# Patient Record
Sex: FEMALE | Race: WHITE | HISPANIC OR LATINO
[De-identification: names, ages, dates, MRNs, and addresses within clinical notes are randomized per-mention and may not be internally consistent; named-entity substitution may affect disease eponyms.]

---

## 2017-07-27 PROBLEM — Z00.00 ENCOUNTER FOR PREVENTIVE HEALTH EXAMINATION: Status: ACTIVE | Noted: 2017-07-27

## 2017-08-09 ENCOUNTER — APPOINTMENT (OUTPATIENT)
Dept: CARDIOLOGY | Facility: CLINIC | Age: 47
End: 2017-08-09
Payer: COMMERCIAL

## 2017-08-09 PROCEDURE — 93000 ELECTROCARDIOGRAM COMPLETE: CPT

## 2017-08-09 PROCEDURE — 99214 OFFICE O/P EST MOD 30 MIN: CPT

## 2017-08-23 ENCOUNTER — APPOINTMENT (OUTPATIENT)
Dept: CARDIOLOGY | Facility: CLINIC | Age: 47
End: 2017-08-23

## 2017-11-25 ENCOUNTER — RX RENEWAL (OUTPATIENT)
Age: 47
End: 2017-11-25

## 2018-01-12 ENCOUNTER — RECORD ABSTRACTING (OUTPATIENT)
Age: 48
End: 2018-01-12

## 2018-01-15 ENCOUNTER — APPOINTMENT (OUTPATIENT)
Dept: CARDIOLOGY | Facility: CLINIC | Age: 48
End: 2018-01-15
Payer: COMMERCIAL

## 2018-01-15 VITALS
HEART RATE: 72 BPM | BODY MASS INDEX: 45.76 KG/M2 | DIASTOLIC BLOOD PRESSURE: 84 MMHG | SYSTOLIC BLOOD PRESSURE: 140 MMHG | WEIGHT: 227 LBS | HEIGHT: 59 IN

## 2018-01-15 DIAGNOSIS — Z78.9 OTHER SPECIFIED HEALTH STATUS: ICD-10-CM

## 2018-01-15 DIAGNOSIS — Z82.49 FAMILY HISTORY OF ISCHEMIC HEART DISEASE AND OTHER DISEASES OF THE CIRCULATORY SYSTEM: ICD-10-CM

## 2018-01-15 DIAGNOSIS — Z82.5 FAMILY HISTORY OF ASTHMA AND OTHER CHRONIC LOWER RESPIRATORY DISEASES: ICD-10-CM

## 2018-01-15 PROCEDURE — 99213 OFFICE O/P EST LOW 20 MIN: CPT

## 2018-01-18 ENCOUNTER — RX RENEWAL (OUTPATIENT)
Age: 48
End: 2018-01-18

## 2018-02-20 ENCOUNTER — APPOINTMENT (OUTPATIENT)
Dept: CARDIOLOGY | Facility: CLINIC | Age: 48
End: 2018-02-20

## 2018-02-23 ENCOUNTER — RX RENEWAL (OUTPATIENT)
Age: 48
End: 2018-02-23

## 2018-02-23 ENCOUNTER — APPOINTMENT (OUTPATIENT)
Dept: CARDIOLOGY | Facility: CLINIC | Age: 48
End: 2018-02-23
Payer: COMMERCIAL

## 2018-02-23 VITALS
BODY MASS INDEX: 46.16 KG/M2 | HEIGHT: 59 IN | HEART RATE: 58 BPM | SYSTOLIC BLOOD PRESSURE: 110 MMHG | DIASTOLIC BLOOD PRESSURE: 68 MMHG | WEIGHT: 229 LBS

## 2018-02-23 PROCEDURE — 99214 OFFICE O/P EST MOD 30 MIN: CPT

## 2018-02-23 RX ORDER — CHLORTHALIDONE 25 MG/1
25 TABLET ORAL DAILY
Qty: 90 | Refills: 1 | Status: DISCONTINUED | COMMUNITY
Start: 2018-01-18 | End: 2018-02-23

## 2018-03-12 ENCOUNTER — APPOINTMENT (OUTPATIENT)
Dept: CARDIOLOGY | Facility: CLINIC | Age: 48
End: 2018-03-12
Payer: COMMERCIAL

## 2018-03-12 VITALS
SYSTOLIC BLOOD PRESSURE: 130 MMHG | WEIGHT: 226 LBS | BODY MASS INDEX: 45.56 KG/M2 | DIASTOLIC BLOOD PRESSURE: 90 MMHG | HEART RATE: 64 BPM | HEIGHT: 59 IN

## 2018-03-12 PROCEDURE — 99213 OFFICE O/P EST LOW 20 MIN: CPT

## 2018-03-12 RX ORDER — ASPIRIN 325 MG/1
325 TABLET, COATED ORAL DAILY
Refills: 0 | Status: DISCONTINUED | COMMUNITY
End: 2018-03-12

## 2018-03-12 RX ORDER — POTASSIUM CHLORIDE 750 MG/1
10 TABLET, FILM COATED, EXTENDED RELEASE ORAL DAILY
Qty: 90 | Refills: 1 | Status: DISCONTINUED | COMMUNITY
Start: 2018-01-18 | End: 2018-03-12

## 2018-04-15 ENCOUNTER — RX RENEWAL (OUTPATIENT)
Age: 48
End: 2018-04-15

## 2018-06-10 ENCOUNTER — MEDICATION RENEWAL (OUTPATIENT)
Age: 48
End: 2018-06-10

## 2018-06-12 ENCOUNTER — MEDICATION RENEWAL (OUTPATIENT)
Age: 48
End: 2018-06-12

## 2018-11-26 ENCOUNTER — RX RENEWAL (OUTPATIENT)
Age: 48
End: 2018-11-26

## 2019-03-11 ENCOUNTER — APPOINTMENT (OUTPATIENT)
Dept: CARDIOLOGY | Facility: CLINIC | Age: 49
End: 2019-03-11

## 2019-06-20 ENCOUNTER — RX RENEWAL (OUTPATIENT)
Age: 49
End: 2019-06-20

## 2019-07-08 ENCOUNTER — TRANSCRIPTION ENCOUNTER (OUTPATIENT)
Age: 49
End: 2019-07-08

## 2019-09-03 ENCOUNTER — RX RENEWAL (OUTPATIENT)
Age: 49
End: 2019-09-03

## 2019-09-24 ENCOUNTER — RX CHANGE (OUTPATIENT)
Age: 49
End: 2019-09-24

## 2019-11-18 ENCOUNTER — MEDICATION RENEWAL (OUTPATIENT)
Age: 49
End: 2019-11-18

## 2020-05-04 RX ORDER — CHLORTHALIDONE 25 MG/1
25 TABLET ORAL DAILY
Qty: 90 | Refills: 0 | Status: DISCONTINUED | COMMUNITY
Start: 2018-04-26 | End: 2020-05-04

## 2020-05-04 RX ORDER — POTASSIUM CHLORIDE 750 MG/1
10 CAPSULE, EXTENDED RELEASE ORAL
Qty: 90 | Refills: 2 | Status: DISCONTINUED | COMMUNITY
Start: 2018-04-26 | End: 2020-05-04

## 2020-05-08 ENCOUNTER — APPOINTMENT (OUTPATIENT)
Dept: CARDIOLOGY | Facility: CLINIC | Age: 50
End: 2020-05-08
Payer: COMMERCIAL

## 2020-05-08 VITALS
SYSTOLIC BLOOD PRESSURE: 140 MMHG | HEIGHT: 59 IN | TEMPERATURE: 98 F | OXYGEN SATURATION: 96 % | WEIGHT: 246 LBS | RESPIRATION RATE: 16 BRPM | HEART RATE: 63 BPM | DIASTOLIC BLOOD PRESSURE: 92 MMHG | BODY MASS INDEX: 49.59 KG/M2

## 2020-05-08 PROCEDURE — 99213 OFFICE O/P EST LOW 20 MIN: CPT

## 2020-05-08 NOTE — PHYSICAL EXAM
[Well Groomed] : well groomed [Normal Appearance] : normal appearance [General Appearance - Well Developed] : well developed [General Appearance - In No Acute Distress] : no acute distress [General Appearance - Well Nourished] : well nourished [No Deformities] : no deformities [Eyelids - No Xanthelasma] : the eyelids demonstrated no xanthelasmas [Normal Conjunctiva] : the conjunctiva exhibited no abnormalities [Normal Oral Mucosa] : normal oral mucosa [No Oral Pallor] : no oral pallor [No Oral Cyanosis] : no oral cyanosis [Normal Jugular Venous V Waves Present] : normal jugular venous V waves present [Normal Jugular Venous A Waves Present] : normal jugular venous A waves present [No Jugular Venous Salas A Waves] : no jugular venous salas A waves [Exaggerated Use Of Accessory Muscles For Inspiration] : no accessory muscle use [Respiration, Rhythm And Depth] : normal respiratory rhythm and effort [Heart Rate And Rhythm] : heart rate and rhythm were normal [Auscultation Breath Sounds / Voice Sounds] : lungs were clear to auscultation bilaterally [Heart Sounds] : normal S1 and S2 [Abdomen Tenderness] : non-tender [Abdomen Soft] : soft [Murmurs] : no murmurs present [Abdomen Mass (___ Cm)] : no abdominal mass palpated [Abnormal Walk] : normal gait [Gait - Sufficient For Exercise Testing] : the gait was sufficient for exercise testing [Nail Clubbing] : no clubbing of the fingernails [Cyanosis, Localized] : no localized cyanosis [Petechial Hemorrhages (___cm)] : no petechial hemorrhages [] : no rash [Skin Color & Pigmentation] : normal skin color and pigmentation [No Venous Stasis] : no venous stasis [No Skin Ulcers] : no skin ulcer [No Xanthoma] : no  xanthoma was observed [Skin Lesions] : no skin lesions [Affect] : the affect was normal [Mood] : the mood was normal [Oriented To Time, Place, And Person] : oriented to person, place, and time [No Anxiety] : not feeling anxious

## 2020-06-05 NOTE — ASSESSMENT
[FreeTextEntry1] : Hypertension well controlled. Continue blood pressure monitoring. /80 repeated. Continue present medications. Continue primary prevention.\par Cardiac followup yearly and as needed.\par Addendum June 5, 2020\par Patient is preop for ACL repair.  Based on examination from May 8, 2020 patient is well compensated in no significant risk for cardiovascular events during anticipated procedure.

## 2020-06-05 NOTE — HISTORY OF PRESENT ILLNESS
[FreeTextEntry1] : The patient is presented here today for cardiac followup visit. The patient has a history of hypertension. \par Since losartan was started on last visit her blood pressure is well-controlled. She reports no side effects.\par Recently her blood pressure is not be well-controlled. She increase the dose of losartan to 50 mg daily. She denies any chest pains or shortness of breath. Blood pressure is well controlled.

## 2020-12-01 ENCOUNTER — APPOINTMENT (OUTPATIENT)
Dept: CARDIOLOGY | Facility: CLINIC | Age: 50
End: 2020-12-01

## 2021-01-07 ENCOUNTER — APPOINTMENT (OUTPATIENT)
Dept: CARDIOLOGY | Facility: CLINIC | Age: 51
End: 2021-01-07
Payer: COMMERCIAL

## 2021-01-07 VITALS
OXYGEN SATURATION: 99 % | DIASTOLIC BLOOD PRESSURE: 104 MMHG | HEART RATE: 76 BPM | SYSTOLIC BLOOD PRESSURE: 162 MMHG | BODY MASS INDEX: 51.2 KG/M2 | WEIGHT: 254 LBS | HEIGHT: 59 IN

## 2021-01-07 VITALS — DIASTOLIC BLOOD PRESSURE: 96 MMHG | SYSTOLIC BLOOD PRESSURE: 142 MMHG

## 2021-01-07 PROCEDURE — 99072 ADDL SUPL MATRL&STAF TM PHE: CPT

## 2021-01-07 PROCEDURE — 99214 OFFICE O/P EST MOD 30 MIN: CPT

## 2021-01-07 RX ORDER — METOPROLOL TARTRATE 100 MG/1
100 TABLET, FILM COATED ORAL
Qty: 180 | Refills: 0 | Status: DISCONTINUED | COMMUNITY
Start: 2017-11-26 | End: 2021-01-07

## 2021-01-07 RX ORDER — LEVOTHYROXINE SODIUM 0.05 MG/1
50 TABLET ORAL DAILY
Refills: 0 | Status: DISCONTINUED | COMMUNITY
End: 2021-01-07

## 2021-01-07 RX ORDER — LOSARTAN POTASSIUM 25 MG/1
25 TABLET, FILM COATED ORAL
Qty: 30 | Refills: 0 | Status: DISCONTINUED | COMMUNITY
Start: 2018-02-23 | End: 2021-01-07

## 2021-01-07 RX ORDER — ASPIRIN ENTERIC COATED TABLETS 81 MG 81 MG/1
81 TABLET, DELAYED RELEASE ORAL DAILY
Refills: 0 | Status: ACTIVE | COMMUNITY

## 2021-01-07 RX ORDER — LOSARTAN POTASSIUM 50 MG/1
50 TABLET, FILM COATED ORAL DAILY
Qty: 90 | Refills: 2 | Status: DISCONTINUED | COMMUNITY
Start: 2020-05-08 | End: 2021-01-07

## 2021-01-08 NOTE — PHYSICAL EXAM
[General Appearance - Well Developed] : well developed [Normal Appearance] : normal appearance [Well Groomed] : well groomed [General Appearance - Well Nourished] : well nourished [No Deformities] : no deformities [General Appearance - In No Acute Distress] : no acute distress [Normal Conjunctiva] : the conjunctiva exhibited no abnormalities [Eyelids - No Xanthelasma] : the eyelids demonstrated no xanthelasmas [Normal Oral Mucosa] : normal oral mucosa [No Oral Pallor] : no oral pallor [No Oral Cyanosis] : no oral cyanosis [Normal Jugular Venous A Waves Present] : normal jugular venous A waves present [Normal Jugular Venous V Waves Present] : normal jugular venous V waves present [No Jugular Venous Salas A Waves] : no jugular venous salas A waves [Respiration, Rhythm And Depth] : normal respiratory rhythm and effort [Exaggerated Use Of Accessory Muscles For Inspiration] : no accessory muscle use [Auscultation Breath Sounds / Voice Sounds] : lungs were clear to auscultation bilaterally [Heart Rate And Rhythm] : heart rate and rhythm were normal [Heart Sounds] : normal S1 and S2 [Murmurs] : no murmurs present [Abdomen Soft] : soft [Abdomen Tenderness] : non-tender [Abdomen Mass (___ Cm)] : no abdominal mass palpated [Abnormal Walk] : normal gait [Gait - Sufficient For Exercise Testing] : the gait was sufficient for exercise testing [Nail Clubbing] : no clubbing of the fingernails [Cyanosis, Localized] : no localized cyanosis [Petechial Hemorrhages (___cm)] : no petechial hemorrhages [Skin Color & Pigmentation] : normal skin color and pigmentation [] : no rash [No Venous Stasis] : no venous stasis [Skin Lesions] : no skin lesions [No Skin Ulcers] : no skin ulcer [No Xanthoma] : no  xanthoma was observed [Oriented To Time, Place, And Person] : oriented to person, place, and time [Affect] : the affect was normal [Mood] : the mood was normal [No Anxiety] : not feeling anxious [FreeTextEntry1] : pleasant moderately obese middle-aged female

## 2021-01-08 NOTE — HISTORY OF PRESENT ILLNESS
[FreeTextEntry1] : Ricarda is a 50-year-old female with history of hypertension on losartan, Lopressor, obesity, medical fall right leg fracture surgery July 2020, dependent leg edema, chest pain, nonobstructive catheterization 2017. \par \par No history of CAD, MI, revascularization, VHD, CHF, TIA, CVA, diabetes, PVD, DVT, PE, arrhythmia, AF.\par \par Cardiovascular review of symptoms is negative for exertional chest pain, dyspnea, palpitations, dizziness or syncope.  No PND or orthopnea leg edema.  No bleeding or black stool.\par \par Patient is walking 15 minutes without exertional chest pain.  Patient is compliant with diet and medications.\par \par Echocardiogram July 2017 LVEF 60%, mild MR, normal RVSP\par \par Cardiac catheterization [facility] July 2017, the EF 55% nonobstructive coronaries

## 2021-01-08 NOTE — ASSESSMENT
[FreeTextEntry1] : Ricarda is a 50-year-old female with medical history detailed above and active medical issues including:\par \par -Labile hypertension average home SBPs 160s heart rate 70s.  Losartan increased from 50 mg daily to 50 mg twice daily with HCTZ 25 mg.  Continue Lopressor 100 twice daily.  Continue to follow home BPs prior to dosage,  repeat labs 2 weeks.\par \par -Fall right leg fracture surgery by 2020, mild right leg dependent edema \par \par - No further chest pain nonobstructive catheterization 2017\par \par Advised patient to follow active lifestyle with regular cardiovascular exercise. Patient educated on lifestyle and diet modification with low sodium low fat diet and avoidance of excessive alcohol. Patient is aware to call with any symptoms or concerns. \par \par Cardiology follow-up 4 weeks. \par \par Ricarda will follow up with Dr Jh Cavanaugh for primary care.\par \par

## 2021-01-08 NOTE — REASON FOR VISIT
[Follow-Up - Clinic] : a clinic follow-up of [Hypertension] : hypertension [FreeTextEntry2] : HTN, obesity

## 2021-01-18 ENCOUNTER — RX CHANGE (OUTPATIENT)
Age: 51
End: 2021-01-18

## 2021-02-11 ENCOUNTER — APPOINTMENT (OUTPATIENT)
Dept: CARDIOLOGY | Facility: CLINIC | Age: 51
End: 2021-02-11

## 2021-10-21 ENCOUNTER — APPOINTMENT (OUTPATIENT)
Dept: CARDIOLOGY | Facility: CLINIC | Age: 51
End: 2021-10-21
Payer: COMMERCIAL

## 2021-10-21 PROCEDURE — 99443: CPT

## 2021-10-21 RX ORDER — HYDROCHLOROTHIAZIDE 25 MG/1
25 TABLET ORAL
Qty: 30 | Refills: 0 | Status: DISCONTINUED | COMMUNITY
Start: 2021-01-07 | End: 2021-10-21

## 2021-10-21 RX ORDER — LOSARTAN POTASSIUM 50 MG/1
50 TABLET, FILM COATED ORAL TWICE DAILY
Qty: 180 | Refills: 1 | Status: ACTIVE | COMMUNITY
Start: 2020-05-08 | End: 1900-01-01

## 2022-01-02 ENCOUNTER — TRANSCRIPTION ENCOUNTER (OUTPATIENT)
Age: 52
End: 2022-01-02

## 2022-04-04 ENCOUNTER — APPOINTMENT (OUTPATIENT)
Dept: ORTHOPEDIC SURGERY | Facility: CLINIC | Age: 52
End: 2022-04-04
Payer: OTHER MISCELLANEOUS

## 2022-04-04 PROCEDURE — 99072 ADDL SUPL MATRL&STAF TM PHE: CPT

## 2022-04-04 PROCEDURE — 20611 DRAIN/INJ JOINT/BURSA W/US: CPT | Mod: RT

## 2022-04-04 PROCEDURE — 99212 OFFICE O/P EST SF 10 MIN: CPT

## 2022-04-04 RX ADMIN — SODIUM HYALURONATE INTRA-ARTICULAR SOLN PREF SYR 25 MG/2.5ML 1 MG/2.5ML: 25/2.5 SOLUTION PREFILLED SYRINGE at 00:00

## 2022-04-04 NOTE — PROCEDURE
[Right] : of the right [Knee] : knee [Pain] : pain [Inflammation] : inflammation [Alcohol] : alcohol [Betadine] : betadine [Ethyl Chloride sprayed topically] : ethyl chloride sprayed topically [Sterile technique used] : sterile technique used [Supartz] : Supartz [#4] : series #4 [Previous OTC use and PT nontherapeutic] : patient has tried OTC's including aspirin, Ibuprofen, Aleve, etc or prescription NSAIDS, and/or exercises at home and/or physical therapy without satisfactory response [Patient had decreased mobility in the joint] : patient had decreased mobility in the joint [Risks, benefits, alternatives discussed / Verbal consent obtained] : the risks benefits, and alternatives have been discussed, and verbal consent was obtained [Morbid obesity] : morbid obesity [All ultrasound images have been permanently captured and stored accordingly in our picture archiving and communication system] : All ultrasound images have been permanently captured and stored accordingly in our picture archiving and communication system [Visualization of the needle and placement of injection was performed without complication] : visualization of the needle and placement of injection was performed without complication

## 2022-05-04 ENCOUNTER — RX RENEWAL (OUTPATIENT)
Age: 52
End: 2022-05-04

## 2022-05-04 RX ORDER — CYCLOBENZAPRINE HYDROCHLORIDE 5 MG/1
5 TABLET, FILM COATED ORAL
Qty: 30 | Refills: 0 | Status: ACTIVE | COMMUNITY
Start: 2022-05-04 | End: 1900-01-01

## 2022-06-06 ENCOUNTER — APPOINTMENT (OUTPATIENT)
Dept: ORTHOPEDIC SURGERY | Facility: CLINIC | Age: 52
End: 2022-06-06
Payer: OTHER MISCELLANEOUS

## 2022-06-06 PROCEDURE — 99072 ADDL SUPL MATRL&STAF TM PHE: CPT

## 2022-06-06 PROCEDURE — 99213 OFFICE O/P EST LOW 20 MIN: CPT

## 2022-06-06 NOTE — PHYSICAL EXAM
[Right] : right knee [4___] : hamstring 4[unfilled]/5 [] : patient ambulates without assistive device [TWNoteComboBox7] : flexion 120 degrees [de-identified] : extension 5 degrees

## 2022-06-06 NOTE — HISTORY OF PRESENT ILLNESS
[de-identified] : Patient reports some good days and some bad days. She does still have some pain but notes improvement with the injections

## 2022-07-06 ENCOUNTER — APPOINTMENT (OUTPATIENT)
Dept: PULMONOLOGY | Facility: CLINIC | Age: 52
End: 2022-07-06

## 2022-07-06 PROCEDURE — 94729 DIFFUSING CAPACITY: CPT

## 2022-07-06 PROCEDURE — 94010 BREATHING CAPACITY TEST: CPT

## 2022-07-06 PROCEDURE — 94727 GAS DIL/WSHOT DETER LNG VOL: CPT

## 2022-07-08 ENCOUNTER — APPOINTMENT (OUTPATIENT)
Dept: PULMONOLOGY | Facility: CLINIC | Age: 52
End: 2022-07-08

## 2022-07-08 VITALS
HEART RATE: 78 BPM | DIASTOLIC BLOOD PRESSURE: 90 MMHG | BODY MASS INDEX: 51.41 KG/M2 | WEIGHT: 255 LBS | HEIGHT: 59 IN | OXYGEN SATURATION: 96 % | SYSTOLIC BLOOD PRESSURE: 132 MMHG | TEMPERATURE: 97 F

## 2022-07-08 DIAGNOSIS — E03.9 HYPOTHYROIDISM, UNSPECIFIED: ICD-10-CM

## 2022-07-08 DIAGNOSIS — Z23 ENCOUNTER FOR IMMUNIZATION: ICD-10-CM

## 2022-07-08 DIAGNOSIS — Z01.811 ENCOUNTER FOR PREPROCEDURAL RESPIRATORY EXAMINATION: ICD-10-CM

## 2022-07-08 PROCEDURE — 99204 OFFICE O/P NEW MOD 45 MIN: CPT

## 2022-07-08 RX ORDER — FLUTICASONE FUROATE AND VILANTEROL 200; 25 UG/1; UG/1
200-25 POWDER RESPIRATORY (INHALATION)
Qty: 60 | Refills: 0 | Status: ACTIVE | COMMUNITY
Start: 2022-07-05

## 2022-07-08 RX ORDER — OLMESARTAN MEDOXOMIL 40 MG/1
40 TABLET, FILM COATED ORAL
Qty: 30 | Refills: 0 | Status: ACTIVE | COMMUNITY
Start: 2022-07-06

## 2022-07-08 RX ORDER — FLUTICASONE PROPIONATE 50 UG/1
50 SPRAY, METERED NASAL
Qty: 48 | Refills: 0 | Status: ACTIVE | COMMUNITY
Start: 2021-09-23

## 2022-07-08 RX ORDER — LEVOTHYROXINE SODIUM 0.12 MG/1
125 TABLET ORAL
Qty: 90 | Refills: 0 | Status: ACTIVE | COMMUNITY
Start: 2022-06-01

## 2022-07-08 RX ORDER — OMEPRAZOLE 40 MG/1
40 CAPSULE, DELAYED RELEASE ORAL
Qty: 30 | Refills: 0 | Status: ACTIVE | COMMUNITY
Start: 2021-12-20

## 2022-07-08 RX ORDER — NORETHINDRONE 0.35 MG/1
0.35 TABLET ORAL
Qty: 84 | Refills: 0 | Status: ACTIVE | COMMUNITY
Start: 2022-05-23

## 2022-07-08 RX ORDER — FLUTICASONE FUROATE AND VILANTEROL TRIFENATATE 100; 25 UG/1; UG/1
100-25 POWDER RESPIRATORY (INHALATION)
Qty: 60 | Refills: 0 | Status: ACTIVE | COMMUNITY
Start: 2022-01-21

## 2022-07-12 NOTE — REASON FOR VISIT
[Initial] : an initial visit [Obesity] : obesity [Sleep Apnea] : sleep apnea [TextBox_44] : [facility] Bariatric surgery Dr. Taj Bravo [facility]

## 2022-07-12 NOTE — HISTORY OF PRESENT ILLNESS
[Never] : never [CPAP:] : CPAP [TextBox_4] : this year Dxed with  AJAY this year  started on Breo for cough  and worked well   \par 7/8/22ana maría Rivera is a 53yo female with a hx of no major medical problems  She was dxed with sleep apnea this year and is on CPAP. She has long term obesity and has not been able to loose weight She is to have bariatric surgery later this month. She had a chronic cough and was treated with Breo and the cough is controlled. She had a PFt earlier this week   [TextBox_133] : 100

## 2022-07-12 NOTE — REVIEW OF SYSTEMS
[Nasal Congestion] : nasal congestion [Postnasal Drip] : postnasal drip [Sinus Problems] : sinus problems [Seasonal Allergies] : seasonal allergies [Arthralgias] : arthralgias [Diabetes] : diabetes [Thyroid Problem] : thyroid problem [Obesity] : obesity [Negative] : Dermatologic [GERD] : no gerd [Diarrhea] : no diarrhea [Constipation] : no constipation [Dysphagia] : no dysphagia [Hepatic Disease] : no hepatic disease [Anemia] : no anemia [Headache] : no headache [Dizziness] : no dizziness [Numbness] : no numbness [Memory Loss] : no memory loss [Involuntary Movements] : no involuntary movements [Tremor] : no tremor [Depression] : no depression [Anxiety] : no anxiety [Panic Attacks] : no panic attacks [TextBox_3] : heaviest weight  [TextBox_69] : no ETOH [TextBox_94] : injured right knee

## 2022-07-12 NOTE — ASSESSMENT
[FreeTextEntry1] : 7/08/22 The patient has had long term obesity and has not been able to lose weight. She has a BMi of 51 and is limited in what she can do and is concerned about her future health problems  She had a PFt that is c/w mild restriction form her severe morbid obesity and was dxed with sleep apnea and is on CPAP. She has no contraindication for the proposed partial gastric resection under general anesthesia. She has a very high BMI of 51 which gives her a greater chance of respiratory complications and to have a worse outcome from respiratory complications. Thank you for the consultation  Md Celsa  45minutes spent in evaluation and counselling

## 2022-07-12 NOTE — PHYSICAL EXAM
[No Acute Distress] : no acute distress [Normal Oropharynx] : normal oropharynx [Normal Appearance] : normal appearance [No Neck Mass] : no neck mass [Normal Rate/Rhythm] : normal rate/rhythm [Normal S1, S2] : normal s1, s2 [No Murmurs] : no murmurs [No Resp Distress] : no resp distress [Clear to Auscultation Bilaterally] : clear to auscultation bilaterally [No Abnormalities] : no abnormalities [Benign] : benign [Normal Gait] : normal gait [No Clubbing] : no clubbing [No Cyanosis] : no cyanosis [No Edema] : no edema [FROM] : FROM [Normal Color/ Pigmentation] : normal color/ pigmentation [No Focal Deficits] : no focal deficits [Oriented x3] : oriented x3 [Normal Affect] : normal affect [TextBox_2] : morbidly obese  BMI 51

## 2022-07-16 ENCOUNTER — RESULT REVIEW (OUTPATIENT)
Age: 52
End: 2022-07-16

## 2022-07-28 ENCOUNTER — NON-APPOINTMENT (OUTPATIENT)
Age: 52
End: 2022-07-28

## 2022-08-03 ENCOUNTER — RESULT REVIEW (OUTPATIENT)
Age: 52
End: 2022-08-03

## 2022-08-22 ENCOUNTER — APPOINTMENT (OUTPATIENT)
Dept: ORTHOPEDIC SURGERY | Facility: CLINIC | Age: 52
End: 2022-08-22

## 2022-08-22 PROCEDURE — 99072 ADDL SUPL MATRL&STAF TM PHE: CPT

## 2022-08-22 PROCEDURE — 99213 OFFICE O/P EST LOW 20 MIN: CPT

## 2022-08-26 NOTE — PHYSICAL EXAM
[Right] : right knee [4___] : hamstring 4[unfilled]/5 [] : no extensor lag [TWNoteComboBox7] : flexion 120 degrees [de-identified] : extension 5 degrees

## 2022-08-26 NOTE — HISTORY OF PRESENT ILLNESS
[de-identified] : Patient reports improvement but still has some locking in the knee. She is also having some lateral hip pain and difficulty with stairs.

## 2022-09-07 ENCOUNTER — RX RENEWAL (OUTPATIENT)
Age: 52
End: 2022-09-07

## 2022-10-17 ENCOUNTER — APPOINTMENT (OUTPATIENT)
Dept: ORTHOPEDIC SURGERY | Facility: CLINIC | Age: 52
End: 2022-10-17

## 2022-10-17 PROCEDURE — 99213 OFFICE O/P EST LOW 20 MIN: CPT

## 2022-10-17 PROCEDURE — 99072 ADDL SUPL MATRL&STAF TM PHE: CPT

## 2022-10-17 NOTE — HISTORY OF PRESENT ILLNESS
[de-identified] : Patient reports intermittent severe anterior knee pain with walking. She did very well with the previous course of visco injections in March. She was able to walk for longer periods and use the stairs without pain.

## 2022-10-17 NOTE — PHYSICAL EXAM
[Right] : right knee [4___] : hamstring 4[unfilled]/5 [] : patella maltracking [TWNoteComboBox7] : flexion 120 degrees [de-identified] : extension 5 degrees

## 2022-10-31 ENCOUNTER — RX RENEWAL (OUTPATIENT)
Age: 52
End: 2022-10-31

## 2022-11-01 ENCOUNTER — FORM ENCOUNTER (OUTPATIENT)
Age: 52
End: 2022-11-01

## 2022-11-14 ENCOUNTER — APPOINTMENT (OUTPATIENT)
Dept: ORTHOPEDIC SURGERY | Facility: CLINIC | Age: 52
End: 2022-11-14

## 2022-11-14 PROCEDURE — 99072 ADDL SUPL MATRL&STAF TM PHE: CPT

## 2022-11-14 PROCEDURE — 20611 DRAIN/INJ JOINT/BURSA W/US: CPT | Mod: RT

## 2022-11-14 NOTE — PROCEDURE
[Large Joint Injection] : Large joint injection [Right] : of the right [Knee] : knee [Pain] : pain [Inflammation] : inflammation [X-ray evidence of Osteoarthritis on this or prior visit] : x-ray evidence of Osteoarthritis on this or prior visit [Repeat series performed] : repeat series performed [Alcohol] : alcohol [Betadine] : betadine [Ethyl Chloride sprayed topically] : ethyl chloride sprayed topically [Supartz] : Supartz [#1] : series #1 [] : Patient tolerated procedure well [Call if redness, pain or fever occur] : call if redness, pain or fever occur [Apply ice for 15min out of every hour for the next 12-24 hours as tolerated] : apply ice for 15 minutes out of every hour for the next 12-24 hours as tolerated [Risks, benefits, alternatives discussed / Verbal consent obtained] : the risks benefits, and alternatives have been discussed, and verbal consent was obtained [Morbid obesity] : morbid obesity [All ultrasound images have been permanently captured and stored accordingly in our picture archiving and communication system] : All ultrasound images have been permanently captured and stored accordingly in our picture archiving and communication system [Visualization of the needle and placement of injection was performed without complication] : visualization of the needle and placement of injection was performed without complication

## 2022-11-28 ENCOUNTER — APPOINTMENT (OUTPATIENT)
Dept: ORTHOPEDIC SURGERY | Facility: CLINIC | Age: 52
End: 2022-11-28

## 2022-11-28 PROCEDURE — 20611 DRAIN/INJ JOINT/BURSA W/US: CPT | Mod: RT

## 2022-11-28 PROCEDURE — 99072 ADDL SUPL MATRL&STAF TM PHE: CPT

## 2022-11-28 NOTE — PROCEDURE
[Large Joint Injection] : Large joint injection [Right] : of the right [Knee] : knee [Pain] : pain [Inflammation] : inflammation [X-ray evidence of Osteoarthritis on this or prior visit] : x-ray evidence of Osteoarthritis on this or prior visit [Repeat series performed] : repeat series performed [Alcohol] : alcohol [Betadine] : betadine [Ethyl Chloride sprayed topically] : ethyl chloride sprayed topically [Supartz (25mg)] : 25mg of Supartz [] : Patient tolerated procedure well [Call if redness, pain or fever occur] : call if redness, pain or fever occur [Apply ice for 15min out of every hour for the next 12-24 hours as tolerated] : apply ice for 15 minutes out of every hour for the next 12-24 hours as tolerated [Risks, benefits, alternatives discussed / Verbal consent obtained] : the risks benefits, and alternatives have been discussed, and verbal consent was obtained [Morbid obesity] : morbid obesity [All ultrasound images have been permanently captured and stored accordingly in our picture archiving and communication system] : All ultrasound images have been permanently captured and stored accordingly in our picture archiving and communication system [Visualization of the needle and placement of injection was performed without complication] : visualization of the needle and placement of injection was performed without complication [#2] : series #2

## 2022-12-05 ENCOUNTER — APPOINTMENT (OUTPATIENT)
Dept: ORTHOPEDIC SURGERY | Facility: CLINIC | Age: 52
End: 2022-12-05

## 2022-12-05 PROCEDURE — 20611 DRAIN/INJ JOINT/BURSA W/US: CPT | Mod: RT

## 2022-12-05 PROCEDURE — 99072 ADDL SUPL MATRL&STAF TM PHE: CPT

## 2022-12-07 NOTE — PROCEDURE
[Large Joint Injection] : Large joint injection [Right] : of the right [Knee] : knee [Pain] : pain [Inflammation] : inflammation [X-ray evidence of Osteoarthritis on this or prior visit] : x-ray evidence of Osteoarthritis on this or prior visit [Repeat series performed] : repeat series performed [Alcohol] : alcohol [Betadine] : betadine [Ethyl Chloride sprayed topically] : ethyl chloride sprayed topically [Supartz (25mg)] : 25mg of Supartz [#3] : series #3 [] : Patient tolerated procedure well [Call if redness, pain or fever occur] : call if redness, pain or fever occur [Apply ice for 15min out of every hour for the next 12-24 hours as tolerated] : apply ice for 15 minutes out of every hour for the next 12-24 hours as tolerated [Risks, benefits, alternatives discussed / Verbal consent obtained] : the risks benefits, and alternatives have been discussed, and verbal consent was obtained [Morbid obesity] : morbid obesity [All ultrasound images have been permanently captured and stored accordingly in our picture archiving and communication system] : All ultrasound images have been permanently captured and stored accordingly in our picture archiving and communication system [Visualization of the needle and placement of injection was performed without complication] : visualization of the needle and placement of injection was performed without complication

## 2022-12-12 ENCOUNTER — RX RENEWAL (OUTPATIENT)
Age: 52
End: 2022-12-12

## 2022-12-12 ENCOUNTER — APPOINTMENT (OUTPATIENT)
Dept: ORTHOPEDIC SURGERY | Facility: CLINIC | Age: 52
End: 2022-12-12

## 2022-12-12 PROCEDURE — 20611 DRAIN/INJ JOINT/BURSA W/US: CPT | Mod: RT

## 2022-12-12 PROCEDURE — 99072 ADDL SUPL MATRL&STAF TM PHE: CPT

## 2022-12-12 NOTE — PROCEDURE
[Large Joint Injection] : Large joint injection [Right] : of the right [Knee] : knee [Pain] : pain [Inflammation] : inflammation [X-ray evidence of Osteoarthritis on this or prior visit] : x-ray evidence of Osteoarthritis on this or prior visit [Repeat series performed] : repeat series performed [Alcohol] : alcohol [Betadine] : betadine [Ethyl Chloride sprayed topically] : ethyl chloride sprayed topically [Supartz (25mg)] : 25mg of Supartz [#4] : series #4 [] : Patient tolerated procedure well [Call if redness, pain or fever occur] : call if redness, pain or fever occur [Apply ice for 15min out of every hour for the next 12-24 hours as tolerated] : apply ice for 15 minutes out of every hour for the next 12-24 hours as tolerated [Risks, benefits, alternatives discussed / Verbal consent obtained] : the risks benefits, and alternatives have been discussed, and verbal consent was obtained [Morbid obesity] : morbid obesity [All ultrasound images have been permanently captured and stored accordingly in our picture archiving and communication system] : All ultrasound images have been permanently captured and stored accordingly in our picture archiving and communication system [Visualization of the needle and placement of injection was performed without complication] : visualization of the needle and placement of injection was performed without complication

## 2023-01-05 PROBLEM — E87.6 HYPOKALEMIA: Status: ACTIVE | Noted: 2018-01-18

## 2023-01-05 PROBLEM — I10 BENIGN ESSENTIAL HYPERTENSION: Status: ACTIVE | Noted: 2017-11-26

## 2023-01-05 PROBLEM — Z98.890 HISTORY OF CARDIAC CATH: Status: ACTIVE | Noted: 2018-01-12

## 2023-01-05 NOTE — HISTORY OF PRESENT ILLNESS
[FreeTextEntry1] : Ricarda is a 50-year-old female with history of hypertension on losartan, Lopressor, obesity, medical fall right leg fracture surgery July 2020, dependent leg edema, chest pain, nonobstructive catheterization 2017. \par \par No history of CAD, MI, revascularization, VHD, CHF, TIA, CVA, diabetes, PVD, DVT, PE, arrhythmia, AF.\par \par Cardiovascular review of symptoms is negative for exertional chest pain, dyspnea, palpitations, dizziness or syncope.  No PND or orthopnea leg edema.  No bleeding or black stool.\par \par Patient is walking 15 minutes without exertional chest pain.  Patient is compliant with diet and medications.\par \par Echocardiogram July 2017 LVEF 60%, mild MR, normal RVSP\par \par Cardiac catheterization [facility] July 2017, the EF 55% nonobstructive coronaries

## 2023-01-05 NOTE — PHYSICAL EXAM
[General Appearance - Well Developed] : well developed [Normal Appearance] : normal appearance [Well Groomed] : well groomed [General Appearance - Well Nourished] : well nourished [No Deformities] : no deformities [General Appearance - In No Acute Distress] : no acute distress [FreeTextEntry1] : pleasant moderately obese middle-aged female [Normal Conjunctiva] : the conjunctiva exhibited no abnormalities [Eyelids - No Xanthelasma] : the eyelids demonstrated no xanthelasmas [Normal Oral Mucosa] : normal oral mucosa [No Oral Pallor] : no oral pallor [No Oral Cyanosis] : no oral cyanosis [Normal Jugular Venous A Waves Present] : normal jugular venous A waves present [Normal Jugular Venous V Waves Present] : normal jugular venous V waves present [No Jugular Venous Salas A Waves] : no jugular venous salas A waves [Respiration, Rhythm And Depth] : normal respiratory rhythm and effort [Exaggerated Use Of Accessory Muscles For Inspiration] : no accessory muscle use Comment: 3.5m post-partum.  Also feels covid vaccination contributing to severity.  Managing thyroid w/ PCP - level recently \"low\".  PCP ordered ESR, CRP, vit D, B12, ferritin.  Will add additional labs including DEJA, will consider then fax order to pt. [Auscultation Breath Sounds / Voice Sounds] : lungs were clear to auscultation bilaterally [Heart Rate And Rhythm] : heart rate and rhythm were normal Detail Level: Simple [Heart Sounds] : normal S1 and S2 Render Risk Assessment In Note?: no [Murmurs] : no murmurs present Comment: Podiatrist removed nail and grew back worse.  OTC Lamisil cream and vinegar soaks. [Abdomen Soft] : soft [Abdomen Tenderness] : non-tender Comment: Will use OTC Lamisil as has onychomycosis.  If not improved in 2 wks consider trial of TAC oint.  Pt has Amlactin cream at home as well. [Abdomen Mass (___ Cm)] : no abdominal mass palpated [Abnormal Walk] : normal gait [Gait - Sufficient For Exercise Testing] : the gait was sufficient for exercise testing [Nail Clubbing] : no clubbing of the fingernails [Cyanosis, Localized] : no localized cyanosis [Petechial Hemorrhages (___cm)] : no petechial hemorrhages [Skin Color & Pigmentation] : normal skin color and pigmentation [] : no rash [No Venous Stasis] : no venous stasis [Skin Lesions] : no skin lesions [No Skin Ulcers] : no skin ulcer [No Xanthoma] : no  xanthoma was observed [Oriented To Time, Place, And Person] : oriented to person, place, and time [Affect] : the affect was normal [Mood] : the mood was normal [No Anxiety] : not feeling anxious

## 2023-01-09 ENCOUNTER — APPOINTMENT (OUTPATIENT)
Dept: ORTHOPEDIC SURGERY | Facility: CLINIC | Age: 53
End: 2023-01-09

## 2023-01-10 ENCOUNTER — APPOINTMENT (OUTPATIENT)
Dept: CARDIOLOGY | Facility: CLINIC | Age: 53
End: 2023-01-10

## 2023-01-10 DIAGNOSIS — I10 ESSENTIAL (PRIMARY) HYPERTENSION: ICD-10-CM

## 2023-01-10 DIAGNOSIS — Z98.890 OTHER SPECIFIED POSTPROCEDURAL STATES: ICD-10-CM

## 2023-01-10 DIAGNOSIS — E87.6 HYPOKALEMIA: ICD-10-CM

## 2023-01-17 RX ORDER — METOPROLOL TARTRATE 100 MG/1
100 TABLET, FILM COATED ORAL TWICE DAILY
Qty: 60 | Refills: 0 | Status: ACTIVE | COMMUNITY
Start: 2020-05-08 | End: 1900-01-01

## 2023-03-13 RX ORDER — LEVOTHYROXINE SODIUM 0.11 MG/1
112 TABLET ORAL DAILY
Refills: 0 | Status: DISCONTINUED | COMMUNITY
End: 2023-03-13

## 2023-03-15 ENCOUNTER — RX RENEWAL (OUTPATIENT)
Age: 53
End: 2023-03-15

## 2023-04-07 ENCOUNTER — APPOINTMENT (OUTPATIENT)
Dept: ENDOCRINOLOGY | Facility: CLINIC | Age: 53
End: 2023-04-07
Payer: COMMERCIAL

## 2023-04-07 VITALS
SYSTOLIC BLOOD PRESSURE: 132 MMHG | WEIGHT: 191 LBS | DIASTOLIC BLOOD PRESSURE: 82 MMHG | TEMPERATURE: 97.5 F | HEART RATE: 52 BPM | OXYGEN SATURATION: 100 % | BODY MASS INDEX: 38.58 KG/M2 | RESPIRATION RATE: 18 BRPM

## 2023-04-07 PROCEDURE — 99213 OFFICE O/P EST LOW 20 MIN: CPT

## 2023-04-07 NOTE — REVIEW OF SYSTEMS
[Recent Weight Loss (___ Lbs)] : recent weight loss: [unfilled] lbs [Nausea] : nausea [Constipation] : constipation [Negative] : Neurological

## 2023-04-07 NOTE — PHYSICAL EXAM
[No Respiratory Distress] : no respiratory distress [Normal Rate] : heart rate was normal [Regular Rhythm] : with a regular rhythm [Carotids Normal] : carotid pulses were normal with no bruits [No Edema] : no peripheral edema [Pedal Pulses Normal] : the pedal pulses are present [Not Tender] : non-tender [Soft] : abdomen soft [Normal Supraclavicular Nodes] : no supraclavicular lymphadenopathy [Normal Posterior Cervical Nodes] : no posterior cervical lymphadenopathy [No CVA Tenderness] : no ~M costovertebral angle tenderness [Normal Gait] : normal gait [No Rash] : no rash [No Skin Lesions] : no skin lesions [No Motor Deficits] : the motor exam was normal [Normal Reflexes] : deep tendon reflexes were 2+ and symmetric [No Tremors] : no tremors [de-identified] : Slightly palpable thyroid nodules bilaterally small and nontender [de-identified] : Deferred [FreeTextEntry1] : Deferred [de-identified] : Deferred [de-identified] : Excessive hair loss slightly irregular menstrual cycles

## 2023-04-07 NOTE — HISTORY OF PRESENT ILLNESS
[FreeTextEntry1] : This is a 53-year-old white female who has a past medical history of primary hypothyroidism multinodular goiter status post fine-needle aspiration and biopsy in October 2017 who recently underwent bariatric surgery for the purpose of weight loss.  Patient currently is taking levothyroxine 125 mcg tablets every day.  She has a history of hypertension and Hashimoto's disease.  Patient claimed that she has lost significant weight from 267 to 193 pounds over the past few months.  She is compliant with the diet but is not able to tolerate major part of her food.  She has been experiencing occasional nausea and abdominal pain.  She also has a history of constipation.  Physically she is not very active.  She she denies any excessive fatigue or tiredness but has noticed significant amount of hair loss.  She denies any muscle pain skin rashes chest pain or shortness of breath.

## 2023-04-07 NOTE — ASSESSMENT
[FreeTextEntry1] : Young white female who has a history of morbid obesity, multinodular goiter, primary hypothyroidism, status post fine-needle aspiration biopsy reported to be benign of the right upper pole nodule.  Patient underwent gastric sleeve surgery for the purpose of weight loss and she has succeeded in losing close to 70 pounds since last year in August.  However she is  complaining of significant hair loss, in spite of the fact that the patient is taking multiple vitamin supplements.  Clinically the patient is euthyroid.  Recommendation\par 1.  I would like to obtain the results of her recent blood test from her doctor's office.\par 2.  I am also referring the patient for a follow-up sonogram of the thyroid\par 3.  If the TSH is within normal range she will continue with the levothyroxine 125 mcg daily otherwise we may have to adjust the dose.  Patient is also taking metoprolol and olmesartan together with a baby aspirin and a birth control pill.\par 4.  The importance of diet exercise and continued weight loss was explained to the patient..  The patient will return to the office for follow-up in approximately 6 months time.  The plan discussed with the patient.  Thank you

## 2023-04-12 ENCOUNTER — FORM ENCOUNTER (OUTPATIENT)
Age: 53
End: 2023-04-12

## 2023-04-12 ENCOUNTER — APPOINTMENT (OUTPATIENT)
Dept: ORTHOPEDIC SURGERY | Facility: CLINIC | Age: 53
End: 2023-04-12
Payer: OTHER MISCELLANEOUS

## 2023-04-12 PROCEDURE — 99213 OFFICE O/P EST LOW 20 MIN: CPT

## 2023-04-12 RX ORDER — DICLOFENAC SODIUM 50 MG/1
50 TABLET, DELAYED RELEASE ORAL
Qty: 60 | Refills: 0 | Status: ACTIVE | COMMUNITY
Start: 2023-04-12 | End: 1900-01-01

## 2023-04-13 NOTE — PHYSICAL EXAM
[Right] : right knee [4___] : hamstring 4[unfilled]/5 [] : no extensor lag [TWNoteComboBox7] : flexion 120 degrees [de-identified] : extension 5 degrees

## 2023-04-13 NOTE — DISCUSSION/SUMMARY
[de-identified] : This patient would benefit from another course of Supartz injections, as she notes significant improvement to work duties and ADLs with the previous course in last year.

## 2023-04-13 NOTE — HISTORY OF PRESENT ILLNESS
[de-identified] : Patient reports severe pain across the patella last week with WB, which has subsided with use of Diclofenac and massage. She did well with the previous course of injections in December. She was able to walk and sit for prolonged periods without pain.

## 2023-04-18 ENCOUNTER — FORM ENCOUNTER (OUTPATIENT)
Age: 53
End: 2023-04-18

## 2023-05-18 ENCOUNTER — APPOINTMENT (OUTPATIENT)
Dept: ORTHOPEDIC SURGERY | Facility: CLINIC | Age: 53
End: 2023-05-18
Payer: COMMERCIAL

## 2023-05-18 DIAGNOSIS — S43.422A SPRAIN OF LEFT ROTATOR CUFF CAPSULE, INITIAL ENCOUNTER: ICD-10-CM

## 2023-05-18 DIAGNOSIS — M75.42 IMPINGEMENT SYNDROME OF LEFT SHOULDER: ICD-10-CM

## 2023-05-18 DIAGNOSIS — S43.421A SPRAIN OF RIGHT ROTATOR CUFF CAPSULE, INITIAL ENCOUNTER: ICD-10-CM

## 2023-05-18 PROCEDURE — 99214 OFFICE O/P EST MOD 30 MIN: CPT

## 2023-05-18 NOTE — PHYSICAL EXAM
[NL (45)] : right lateral flexion 45 degrees [NL (80)] : right lateral rotation 80 degrees [5___] : right grasp 5[unfilled]/5 [Biceps 2+] : biceps 2+ [Triceps 2+] : triceps 2+ [Brachioradialis 2+] : brachioradialis 2+ [Right] : right shoulder [NL (0-180)] : full active abduction 0-180 degrees [NL (0-70)] : full internal rotation 0-70 degrees [NL (0-90)] : full external rotation 0-90 degrees [3 ___] : forward flexion 3[unfilled]/5 [3___] : abduction 3[unfilled]/5 [4___] : internal rotation 4[unfilled]/5 [] : motor and sensory intact distally
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
## 2023-06-14 ENCOUNTER — APPOINTMENT (OUTPATIENT)
Dept: ORTHOPEDIC SURGERY | Facility: CLINIC | Age: 53
End: 2023-06-14
Payer: OTHER MISCELLANEOUS

## 2023-06-14 PROCEDURE — 20611 DRAIN/INJ JOINT/BURSA W/US: CPT | Mod: RT

## 2023-06-14 NOTE — PROCEDURE
No apparent complications or complaints regarding anesthesia care at this time [Right] : of the right [Knee] : knee [Pain] : pain [Inflammation] : inflammation [X-ray evidence of Osteoarthritis on this or prior visit] : x-ray evidence of Osteoarthritis on this or prior visit [Repeat series performed] : repeat series performed [Alcohol] : alcohol [Betadine] : betadine [Ethyl Chloride sprayed topically] : ethyl chloride sprayed topically [Sterile technique used] : sterile technique used [Supartz (25mg)] : 25mg of Supartz [#1] : series #1 [] : Patient tolerated procedure well [Call if redness, pain or fever occur] : call if redness, pain or fever occur [Apply ice for 15min out of every hour for the next 12-24 hours as tolerated] : apply ice for 15 minutes out of every hour for the next 12-24 hours as tolerated [Patient was advised to rest the joint(s) for ____ days] : patient was advised to rest the joint(s) for [unfilled] days [Previous OTC use and PT nontherapeutic] : patient has tried OTC's including aspirin, Ibuprofen, Aleve, etc or prescription NSAIDS, and/or exercises at home and/or physical therapy without satisfactory response [Patient had decreased mobility in the joint] : patient had decreased mobility in the joint [Risks, benefits, alternatives discussed / Verbal consent obtained] : the risks benefits, and alternatives have been discussed, and verbal consent was obtained [All ultrasound images have been permanently captured and stored accordingly in our picture archiving and communication system] : All ultrasound images have been permanently captured and stored accordingly in our picture archiving and communication system [Visualization of the needle and placement of injection was performed without complication] : visualization of the needle and placement of injection was performed without complication

## 2023-06-18 ENCOUNTER — FORM ENCOUNTER (OUTPATIENT)
Age: 53
End: 2023-06-18

## 2023-06-21 ENCOUNTER — APPOINTMENT (OUTPATIENT)
Dept: ORTHOPEDIC SURGERY | Facility: CLINIC | Age: 53
End: 2023-06-21

## 2023-06-28 ENCOUNTER — APPOINTMENT (OUTPATIENT)
Dept: ORTHOPEDIC SURGERY | Facility: CLINIC | Age: 53
End: 2023-06-28

## 2023-06-28 ENCOUNTER — APPOINTMENT (OUTPATIENT)
Dept: ORTHOPEDIC SURGERY | Facility: CLINIC | Age: 53
End: 2023-06-28
Payer: OTHER MISCELLANEOUS

## 2023-06-28 PROCEDURE — 20611 DRAIN/INJ JOINT/BURSA W/US: CPT | Mod: RT

## 2023-06-28 NOTE — PROCEDURE
[Right] : of the right [Knee] : knee [Pain] : pain [Inflammation] : inflammation [X-ray evidence of Osteoarthritis on this or prior visit] : x-ray evidence of Osteoarthritis on this or prior visit [Repeat series performed] : repeat series performed [Alcohol] : alcohol [Betadine] : betadine [Ethyl Chloride sprayed topically] : ethyl chloride sprayed topically [Sterile technique used] : sterile technique used [Supartz (25mg)] : 25mg of Supartz [#2] : series #2 [] : Patient tolerated procedure well [Call if redness, pain or fever occur] : call if redness, pain or fever occur [Apply ice for 15min out of every hour for the next 12-24 hours as tolerated] : apply ice for 15 minutes out of every hour for the next 12-24 hours as tolerated [Patient was advised to rest the joint(s) for ____ days] : patient was advised to rest the joint(s) for [unfilled] days [Previous OTC use and PT nontherapeutic] : patient has tried OTC's including aspirin, Ibuprofen, Aleve, etc or prescription NSAIDS, and/or exercises at home and/or physical therapy without satisfactory response [Patient had decreased mobility in the joint] : patient had decreased mobility in the joint [Risks, benefits, alternatives discussed / Verbal consent obtained] : the risks benefits, and alternatives have been discussed, and verbal consent was obtained [All ultrasound images have been permanently captured and stored accordingly in our picture archiving and communication system] : All ultrasound images have been permanently captured and stored accordingly in our picture archiving and communication system [Visualization of the needle and placement of injection was performed without complication] : visualization of the needle and placement of injection was performed without complication

## 2023-07-05 ENCOUNTER — APPOINTMENT (OUTPATIENT)
Dept: ORTHOPEDIC SURGERY | Facility: CLINIC | Age: 53
End: 2023-07-05

## 2023-07-05 ENCOUNTER — APPOINTMENT (OUTPATIENT)
Dept: ORTHOPEDIC SURGERY | Facility: CLINIC | Age: 53
End: 2023-07-05
Payer: OTHER MISCELLANEOUS

## 2023-07-05 DIAGNOSIS — M17.11 UNILATERAL PRIMARY OSTEOARTHRITIS, RIGHT KNEE: ICD-10-CM

## 2023-07-05 PROCEDURE — 20611 DRAIN/INJ JOINT/BURSA W/US: CPT | Mod: RT

## 2023-07-05 NOTE — PROCEDURE
[Right] : of the right [Knee] : knee [Pain] : pain [Inflammation] : inflammation [X-ray evidence of Osteoarthritis on this or prior visit] : x-ray evidence of Osteoarthritis on this or prior visit [Repeat series performed] : repeat series performed [Alcohol] : alcohol [Betadine] : betadine [Ethyl Chloride sprayed topically] : ethyl chloride sprayed topically [Sterile technique used] : sterile technique used [Supartz (25mg)] : 25mg of Supartz [#3] : series #3 [] : Patient tolerated procedure well [Call if redness, pain or fever occur] : call if redness, pain or fever occur [Apply ice for 15min out of every hour for the next 12-24 hours as tolerated] : apply ice for 15 minutes out of every hour for the next 12-24 hours as tolerated [Patient was advised to rest the joint(s) for ____ days] : patient was advised to rest the joint(s) for [unfilled] days [Previous OTC use and PT nontherapeutic] : patient has tried OTC's including aspirin, Ibuprofen, Aleve, etc or prescription NSAIDS, and/or exercises at home and/or physical therapy without satisfactory response [Patient had decreased mobility in the joint] : patient had decreased mobility in the joint [Risks, benefits, alternatives discussed / Verbal consent obtained] : the risks benefits, and alternatives have been discussed, and verbal consent was obtained [All ultrasound images have been permanently captured and stored accordingly in our picture archiving and communication system] : All ultrasound images have been permanently captured and stored accordingly in our picture archiving and communication system [Visualization of the needle and placement of injection was performed without complication] : visualization of the needle and placement of injection was performed without complication

## 2023-07-12 ENCOUNTER — RX RENEWAL (OUTPATIENT)
Age: 53
End: 2023-07-12

## 2023-07-26 ENCOUNTER — APPOINTMENT (OUTPATIENT)
Dept: ORTHOPEDIC SURGERY | Facility: CLINIC | Age: 53
End: 2023-07-26
Payer: COMMERCIAL

## 2023-07-26 DIAGNOSIS — M75.21 BICIPITAL TENDINITIS, RIGHT SHOULDER: ICD-10-CM

## 2023-07-26 DIAGNOSIS — M25.811 OTHER SPECIFIED JOINT DISORDERS, RIGHT SHOULDER: ICD-10-CM

## 2023-07-26 DIAGNOSIS — M54.12 RADICULOPATHY, CERVICAL REGION: ICD-10-CM

## 2023-07-26 PROCEDURE — 99213 OFFICE O/P EST LOW 20 MIN: CPT

## 2023-07-26 RX ORDER — CYCLOBENZAPRINE HYDROCHLORIDE 5 MG/1
5 TABLET, FILM COATED ORAL
Qty: 30 | Refills: 2 | Status: DISCONTINUED | COMMUNITY
Start: 2023-05-18 | End: 2023-07-26

## 2023-07-26 RX ORDER — DEXTROAMPHETAMINE SACCHARATE AND AMPHETAMINE ASPARTATE AND DEXTROAMPHETAMINE SULFATE AND AMPHETAMINE SULFATE 3.75; 3.75; 3.75; 3.75 MG/1; MG/1; MG/1; MG/1
TABLET ORAL
Refills: 0 | Status: ACTIVE | COMMUNITY

## 2023-07-26 RX ORDER — CYCLOBENZAPRINE HYDROCHLORIDE 5 MG/1
5 TABLET, FILM COATED ORAL
Qty: 30 | Refills: 2 | Status: DISCONTINUED | COMMUNITY
Start: 2023-04-12 | End: 2023-07-26

## 2023-07-26 NOTE — PHYSICAL EXAM
[NL (45)] : right lateral flexion 45 degrees [NL (80)] : right lateral rotation 80 degrees [5___] : right grasp 5[unfilled]/5 [Biceps 2+] : biceps 2+ [Triceps 2+] : triceps 2+ [Brachioradialis 2+] : brachioradialis 2+ [Right] : right shoulder [NL (0-180)] : full active abduction 0-180 degrees [NL (0-70)] : full internal rotation 0-70 degrees [NL (0-90)] : full external rotation 0-90 degrees [3 ___] : forward flexion 3[unfilled]/5 [3___] : abduction 3[unfilled]/5 [4___] : internal rotation 4[unfilled]/5 [] : negative Silver

## 2023-07-26 NOTE — HISTORY OF PRESENT ILLNESS
[de-identified] : Patient reports improvement with PT and HEP but still has positional biceps pain. She is taking Tylenol and Naproxen for the pain.

## 2023-09-14 ENCOUNTER — RX RENEWAL (OUTPATIENT)
Age: 53
End: 2023-09-14

## 2023-09-25 ENCOUNTER — RX RENEWAL (OUTPATIENT)
Age: 53
End: 2023-09-25

## 2023-10-07 ENCOUNTER — RX RENEWAL (OUTPATIENT)
Age: 53
End: 2023-10-07

## 2023-10-07 RX ORDER — CYCLOBENZAPRINE HYDROCHLORIDE 5 MG/1
5 TABLET, FILM COATED ORAL 3 TIMES DAILY
Qty: 90 | Refills: 2 | Status: ACTIVE | COMMUNITY
Start: 2022-06-06 | End: 1900-01-01

## 2023-12-21 ENCOUNTER — APPOINTMENT (OUTPATIENT)
Dept: ENDOCRINOLOGY | Facility: CLINIC | Age: 53
End: 2023-12-21

## 2024-01-24 ENCOUNTER — RX RENEWAL (OUTPATIENT)
Age: 54
End: 2024-01-24

## 2024-03-08 ENCOUNTER — RX RENEWAL (OUTPATIENT)
Age: 54
End: 2024-03-08

## 2024-03-11 RX ORDER — LEVOTHYROXINE SODIUM 0.12 MG/1
125 TABLET ORAL DAILY
Qty: 90 | Refills: 3 | Status: ACTIVE | COMMUNITY
Start: 2023-03-14 | End: 1900-01-01

## 2024-05-13 ENCOUNTER — RX RENEWAL (OUTPATIENT)
Age: 54
End: 2024-05-13

## 2024-05-13 RX ORDER — NAPROXEN 500 MG/1
500 TABLET ORAL
Qty: 60 | Refills: 3 | Status: ACTIVE | COMMUNITY
Start: 2023-05-18 | End: 1900-01-01

## 2024-09-04 ENCOUNTER — RX RENEWAL (OUTPATIENT)
Age: 54
End: 2024-09-04

## 2024-10-04 ENCOUNTER — APPOINTMENT (OUTPATIENT)
Dept: ENDOCRINOLOGY | Facility: CLINIC | Age: 54
End: 2024-10-04
Payer: COMMERCIAL

## 2024-10-04 VITALS
HEART RATE: 62 BPM | WEIGHT: 180 LBS | TEMPERATURE: 97.8 F | BODY MASS INDEX: 36.29 KG/M2 | OXYGEN SATURATION: 100 % | HEIGHT: 59 IN | DIASTOLIC BLOOD PRESSURE: 78 MMHG | SYSTOLIC BLOOD PRESSURE: 136 MMHG

## 2024-10-04 DIAGNOSIS — E03.9 HYPOTHYROIDISM, UNSPECIFIED: ICD-10-CM

## 2024-10-04 DIAGNOSIS — E04.2 NONTOXIC MULTINODULAR GOITER: ICD-10-CM

## 2024-10-04 PROCEDURE — 99213 OFFICE O/P EST LOW 20 MIN: CPT

## 2024-10-04 NOTE — HISTORY OF PRESENT ILLNESS
[FreeTextEntry1] : 54-year-old female with a medical hx of hypothyroidism present for a routine follow up evaluation. Patient is compliant with taking her levothyroxine 112mcg. Other medications include Losartan, Metformin, Metoprolol, Potassium chloride ER 10mg, Hydrochlorothiazide 50mg, Gabapentin 400mg, Cyclobenzaprine 5mg, Chlorthalidone 25mg. Patient denies any significant symptoms of chest pain, sob, abdominal pain, nausea or vomiting. She states that she has insomnia and does not sleep at all during the night. Patient states that she has some discomfort when swallowing. Patient was placed on a cardiology monitor for 7days recently, no significant symptoms. At times she experiences numbness and tingling of her fingers and discoloration. review of systems otherwise is negative patient has no history of any palpitations, nausea vomiting, or abdominal pain,.  Patient claimed that she has been under severe stress and has been very anxious.  Physically she is not very active and does not exercise on a regular basis.  She denies any difficulty swallowing or hoarseness  will repeat labs and US of thyroid by next follow up visit

## 2024-10-04 NOTE — ASSESSMENT
[FreeTextEntry1] : A.m. white female with a past medical history of obesity, status post bariatric surgery, hypertension and anxiety presents for routine follow-up.  She also has history of multinodular goiter which had been biopsied in the past and reported to be negative and she is taking levothyroxine 125 mcg tablets daily for the hypothyroidism.  Patient clinically is stable and appears to be quite anxious.  Recommendation 1.  I will obtain a repeat blood test to check her thyroid level and also a blood glucose level. 2.  Patient will require a follow-up sonogram study of the thyroid to rule out any change in the size of the thyroid nodules. 3.  If stable then we will continue on the current dose of levothyroxine 125 mcg tablets daily.  Patient will return for follow-up evaluation in 6 months time.  Plan discussed with the patient thank you

## 2024-10-04 NOTE — PHYSICAL EXAM
[Alert] : alert [Well Nourished] : well nourished [Obese] : obese [No Acute Distress] : no acute distress [Well Developed] : well developed [Normal Sclera/Conjunctiva] : normal sclera/conjunctiva [EOMI] : extra ocular movement intact [No Proptosis] : no proptosis [Normal Oropharynx] : the oropharynx was normal [Thyroid Not Enlarged] : the thyroid was not enlarged [No Thyroid Nodules] : no palpable thyroid nodules [No Respiratory Distress] : no respiratory distress [No Accessory Muscle Use] : no accessory muscle use [Clear to Auscultation] : lungs were clear to auscultation bilaterally [Normal S1, S2] : normal S1 and S2 [Normal Rate] : heart rate was normal [Regular Rhythm] : with a regular rhythm [No Edema] : no peripheral edema [Pedal Pulses Normal] : the pedal pulses are present [Normal Bowel Sounds] : normal bowel sounds [Not Tender] : non-tender [Not Distended] : not distended [Soft] : abdomen soft [Normal Anterior Cervical Nodes] : no anterior cervical lymphadenopathy [Normal Posterior Cervical Nodes] : no posterior cervical lymphadenopathy [No Spinal Tenderness] : no spinal tenderness [Spine Straight] : spine straight [No Stigmata of Cushings Syndrome] : no stigmata of Cushings Syndrome [Normal Gait] : normal gait [Normal Strength/Tone] : muscle strength and tone were normal [No Rash] : no rash [Acanthosis Nigricans] : no acanthosis nigricans [No Motor Deficits] : the motor exam was normal [Normal Reflexes] : deep tendon reflexes were 2+ and symmetric [No Tremors] : no tremors [Oriented x3] : oriented to person, place, and time [de-identified] : Prominent thyroid gland more on the right lobe slightly nodular with no dominant nodules or any lymph node [de-identified] : Her BMI is still 36.36

## 2024-10-15 ENCOUNTER — APPOINTMENT (OUTPATIENT)
Dept: VASCULAR SURGERY | Facility: CLINIC | Age: 54
End: 2024-10-15

## 2024-12-30 ENCOUNTER — RX RENEWAL (OUTPATIENT)
Age: 54
End: 2024-12-30

## 2025-04-21 ENCOUNTER — RX RENEWAL (OUTPATIENT)
Age: 55
End: 2025-04-21

## 2025-07-16 ENCOUNTER — APPOINTMENT (OUTPATIENT)
Dept: ENDOCRINOLOGY | Facility: CLINIC | Age: 55
End: 2025-07-16
Payer: COMMERCIAL

## 2025-07-16 VITALS
TEMPERATURE: 97 F | DIASTOLIC BLOOD PRESSURE: 82 MMHG | HEART RATE: 72 BPM | WEIGHT: 179 LBS | SYSTOLIC BLOOD PRESSURE: 132 MMHG | BODY MASS INDEX: 36.08 KG/M2 | OXYGEN SATURATION: 97 % | HEIGHT: 59 IN

## 2025-07-16 PROCEDURE — 99213 OFFICE O/P EST LOW 20 MIN: CPT

## 2025-07-30 ENCOUNTER — APPOINTMENT (OUTPATIENT)
Dept: ORTHOPEDIC SURGERY | Facility: CLINIC | Age: 55
End: 2025-07-30
Payer: OTHER MISCELLANEOUS

## 2025-07-30 DIAGNOSIS — M17.11 UNILATERAL PRIMARY OSTEOARTHRITIS, RIGHT KNEE: ICD-10-CM

## 2025-07-30 PROCEDURE — 20611 DRAIN/INJ JOINT/BURSA W/US: CPT | Mod: RT

## 2025-07-30 PROCEDURE — 73562 X-RAY EXAM OF KNEE 3: CPT | Mod: RT

## 2025-07-30 PROCEDURE — 99214 OFFICE O/P EST MOD 30 MIN: CPT | Mod: 25

## 2025-07-30 RX ORDER — DICLOFENAC SODIUM 50 MG/1
50 TABLET, DELAYED RELEASE ORAL
Qty: 60 | Refills: 0 | Status: ACTIVE | COMMUNITY
Start: 2025-07-30 | End: 1900-01-01

## 2025-07-30 RX ORDER — CYCLOBENZAPRINE HYDROCHLORIDE 5 MG/1
5 TABLET, FILM COATED ORAL
Qty: 30 | Refills: 2 | Status: ACTIVE | COMMUNITY
Start: 2025-07-30 | End: 1900-01-01

## 2025-07-30 RX ORDER — CELECOXIB 200 MG/1
200 CAPSULE ORAL
Qty: 30 | Refills: 0 | Status: ACTIVE | COMMUNITY
Start: 2025-07-30 | End: 1900-01-01

## 2025-08-28 ENCOUNTER — RX RENEWAL (OUTPATIENT)
Age: 55
End: 2025-08-28